# Patient Record
Sex: FEMALE | Employment: FULL TIME | URBAN - METROPOLITAN AREA
[De-identification: names, ages, dates, MRNs, and addresses within clinical notes are randomized per-mention and may not be internally consistent; named-entity substitution may affect disease eponyms.]

---

## 2017-02-02 ENCOUNTER — OFFICE VISIT (OUTPATIENT)
Dept: GYNECOLOGY | Age: 27
End: 2017-02-02

## 2017-02-02 VITALS
HEIGHT: 60 IN | DIASTOLIC BLOOD PRESSURE: 95 MMHG | WEIGHT: 118 LBS | BODY MASS INDEX: 23.16 KG/M2 | SYSTOLIC BLOOD PRESSURE: 138 MMHG | HEART RATE: 91 BPM

## 2017-02-02 DIAGNOSIS — D06.9 CIN III (CERVICAL INTRAEPITHELIAL NEOPLASIA III): Primary | ICD-10-CM

## 2017-02-02 NOTE — PROGRESS NOTES
[address] [place] [address] Mathias Moritz [address], [address] [facility]  [phone]  MD Sheree Webb MD Robbi Rodriguez, NP    Office Note  Patient ID:  Name:  Martina Lozano  MRN:  [id number]  :   y.o. Date:  2017      HISTORY OF PRESENT ILLNESS:  Martina Son is a 32 y.o.  premenopausal female who is being seen for severe cervical dysplasia. She is referred by Dr. Stanford Franco in [facility]. A recent pap smear demonstrated ASCUS, can't exclude a high-grade lesion. She was also noted to be high-risk HPV positive. She underwent subsequent colposcopy with biopsy. This revealed IRIS 2 on biopsy, but an ECC demonstrated IRIS 3. I have been asked to see her in consultation for further evaluation and management. She has never had an abnormal pap smear in the past.  She is extremely anxious about the diagnosis. ROS:   and GI review: Negative  Cardiopulmonary review:Negative   Musculoskeletal:  Negative    A comprehensive review of systems was negative except for that written in the History of Present Illness. , 10 point ROS    OB/GYN ROS:    There is no history of significant gyn problems or procedures. Patient denies any abnormal bleeding or vaginal discharge. Problem List:  Patient Active Problem List    Diagnosis Date Noted    IRIS III (cervical intraepithelial neoplasia III) 2017     PMH:  Past Medical History   Diagnosis Date    IRIS III (cervical intraepithelial neoplasia grade III) with severe dysplasia 2017     cervical biopsy      PSH:  History reviewed. No pertinent past surgical history.    Social History:  Social History   Substance Use Topics    Smoking status: Former Smoker    Smokeless tobacco: Not on file    Alcohol use Yes      Comment: 1-2 week      Family History:  Family History   Problem Relation Age of Onset    Cancer Maternal Grandmother       Medications: (reviewed)  No current outpatient prescriptions on file. No current facility-administered medications for this visit. Allergies: (reviewed)  Allergies   Allergen Reactions    Pertussis Vaccine,Adsorbed Shortness of Breath          OBJECTIVE:    Physical Exam:  VITAL SIGNS: Vitals:    02/02/17 0912   BP: (!) 138/95   Pulse: 91   Weight: 118 lb (53.5 kg)   Height: 5' (1.524 m)     Body mass index is 23.05 kg/(m^2). GENERAL LACIE: Conversant, alert, oriented. No acute distress. HEENT: HEENT. No thyroid enlargement. No JVD. Neck: Supple without restrictions. RESPIRATORY: Clear to auscultation and percussion to the bases. No CVAT. CARDIOVASC: RRR without murmur/rub. GASTROINT: soft, non-tender, without masses or organomegaly   MUSCULOSKEL: no joint tenderness, deformity or swelling   EXTREMITIES: extremities normal, atraumatic, no cyanosis or edema   PELVIC: Vulva and vagina appear normal. Normal appearing cervix. No obvious lesions. Bimanual exam reveals normal uterus and adnexa. RECTAL: Deferred   FANTA SURVEY: No suspicious lymphadenopathy or edema noted. NEURO: Grossly intact. No acute deficit. IMPRESSION/PLAN:  Alex Juárez is a 32 y.o. female with a working diagnosis of IRIS 2-3. I reviewed with Alex Juárez her medical records, physical exam, and review of symptoms. I explained to her that she has a precancerous lesion, but not cancer. She seemed very scared today. I reassured her that this is very manageable and it should not effect fertility. I have recommended a conization of the cervix. We should be able to tailor a very narrow cone to remove the involved area, thus not having to remove significant bulk of the cervix. She was counseled on the risks, benefits, indications, and alternatives of the procedure. Her questions were answered and she wishes to proceed.       Signed By: Jame Mosquera MD     2/2/2017/9:12 AM

## 2017-02-02 NOTE — LETTER
2017 10:18 AM 
 
Patient:  Wiley Merino YOB: 1990 Date of Visit: 2017 Dear Tressa Hutchison MD 
[address] [facility] [address] [place] [address] VIA Facsimile: [phone] 
 : Thank you for referring Ms. Wiley Merino to me for evaluation/treatment. Below are the relevant portions of my assessment and plan of care. Pt referred by Dr. Usman Nguyen for IRIS 2/3 on biopsies done 17. [address], [address] 
[phone] MD Heidi Burt MD Vira Radon, ARMINDA Office Note Patient ID: 
Name:  Wiley Merino MRN:  [id number] :  1990/26 y.o. Date:  2017 HISTORY OF PRESENT ILLNESS: 
Wiley Merino is a 32 y.o.  premenopausal female who is being seen for severe cervical dysplasia. She is referred by Dr. Tressa Hutchison in [facility]. A recent pap smear demonstrated ASCUS, can't exclude a high-grade lesion. She was also noted to be high-risk HPV positive. She underwent subsequent colposcopy with biopsy. This revealed IRIS 2 on biopsy, but an ECC demonstrated IRIS 3. I have been asked to see her in consultation for further evaluation and management. She has never had an abnormal pap smear in the past.  She is extremely anxious about the diagnosis. ROS: 
 and GI review: Negative Cardiopulmonary review:Negative Musculoskeletal:  Negative A comprehensive review of systems was negative except for that written in the History of Present Illness. , 10 point ROS 
 
OB/GYN ROS: 
 There is no history of significant gyn problems or procedures. Patient denies any abnormal bleeding or vaginal discharge. Problem List: 
Patient Active Problem List  
 Diagnosis Date Noted  IRIS III (cervical intraepithelial neoplasia III) 2017 PMH: 
Past Medical History Diagnosis Date  IRIS III (cervical intraepithelial neoplasia grade III) with severe dysplasia 01/16/2017  
  cervical biopsy PSH: 
History reviewed. No pertinent past surgical history. Social History: 
Social History Substance Use Topics  Smoking status: Former Smoker  Smokeless tobacco: Not on file  Alcohol use Yes Comment: 1-2 week Family History: 
Family History Problem Relation Age of Onset  Cancer Maternal Grandmother Medications: (reviewed) No current outpatient prescriptions on file. No current facility-administered medications for this visit. Allergies: (reviewed) Allergies Allergen Reactions  Pertussis Vaccine,Adsorbed Shortness of Breath OBJECTIVE: 
 
Physical Exam: VITAL SIGNS: Vitals:  
 02/02/17 0912 BP: (!) 138/95 Pulse: 91  
Weight: 118 lb (53.5 kg) Height: 5' (1.524 m) Body mass index is 23.05 kg/(m^2). GENERAL LACIE: Conversant, alert, oriented. No acute distress. HEENT: HEENT. No thyroid enlargement. No JVD. Neck: Supple without restrictions. RESPIRATORY: Clear to auscultation and percussion to the bases. No CVAT. CARDIOVASC: RRR without murmur/rub. GASTROINT: soft, non-tender, without masses or organomegaly MUSCULOSKEL: no joint tenderness, deformity or swelling EXTREMITIES: extremities normal, atraumatic, no cyanosis or edema PELVIC: Vulva and vagina appear normal. Normal appearing cervix. No obvious lesions. Bimanual exam reveals normal uterus and adnexa. RECTAL: Deferred FANTA SURVEY: No suspicious lymphadenopathy or edema noted. NEURO: Grossly intact. No acute deficit. IMPRESSION/PLAN: 
Uriel Bryant is a 32 y.o. female with a working diagnosis of IRIS 2-3. I reviewed with Uriel Bryant her medical records, physical exam, and review of symptoms. I explained to her that she has a precancerous lesion, but not cancer. She seemed very scared today. I reassured her that this is very manageable and it should not effect fertility.   I have recommended a conization of the cervix. We should be able to tailor a very narrow cone to remove the involved area, thus not having to remove significant bulk of the cervix. She was counseled on the risks, benefits, indications, and alternatives of the procedure. Her questions were answered and she wishes to proceed. Signed By: Macey Bryant MD   
 2/2/2017/9:12 AM  
 
 
 
If you have questions, please do not hesitate to call me. I look forward to following Ms. Shadi Kellogg along with you.  
 
 
 
Sincerely, 
 
 
Macey Bryant MD

## 2017-02-02 NOTE — PATIENT INSTRUCTIONS
[employer] Activation    Thank you for requesting access to [employer]. Please follow the instructions below to securely access and download your online medical record. [employer] allows you to send messages to your doctor, view your test results, renew your prescriptions, schedule appointments, and more. How Do I Sign Up? 1. In your internet browser, go to [email or web address]. 2. Click on the First Time User? Click Here link in the Sign In box. You will see the New Member Sign Up page. 3. Enter your [employer] Access Code exactly as it appears below. You will not need to use this code after youve completed the sign-up process. If you do not sign up before the expiration date, you must request a new code. [employer] Access Code: [email or web address]  Expires: 5/3/2017  9:07 AM (This is the date your [employer] access code will )    4. Enter the last four digits of your Social Security Number (xxxx) and Date of Birth (mm/dd/yyyy) as indicated and click Submit. You will be taken to the next sign-up page. 5. Create a [employer] ID. This will be your [employer] login ID and cannot be changed, so think of one that is secure and easy to remember. 6. Create a [employer] password. You can change your password at any time. 7. Enter your Password Reset Question and Answer. This can be used at a later time if you forget your password. 8. Enter your e-mail address. You will receive e-mail notification when new information is available in [address] 9. Click Sign Up. You can now view and download portions of your medical record. 10. Click the Download Summary menu link to download a portable copy of your medical information. Additional Information    If you have questions, please visit the Frequently Asked Questions section of the [employer] website at [email or web address] Remember, [employer] is NOT to be used for urgent needs. For medical emergencies, dial 911.

## 2017-02-13 ENCOUNTER — ANESTHESIA EVENT (OUTPATIENT)
Dept: SURGERY | Age: 27
End: 2017-02-13
Payer: COMMERCIAL

## 2017-02-17 ENCOUNTER — SURGERY (OUTPATIENT)
Age: 27
End: 2017-02-17

## 2017-02-17 ENCOUNTER — ANESTHESIA (OUTPATIENT)
Dept: SURGERY | Age: 27
End: 2017-02-17
Payer: COMMERCIAL

## 2017-02-17 ENCOUNTER — HOSPITAL ENCOUNTER (OUTPATIENT)
Age: 27
Setting detail: OUTPATIENT SURGERY
Discharge: HOME | End: 2017-02-17
Attending: OBSTETRICS & GYNECOLOGY | Admitting: OBSTETRICS & GYNECOLOGY
Payer: COMMERCIAL

## 2017-02-17 VITALS
BODY MASS INDEX: 23.56 KG/M2 | OXYGEN SATURATION: 100 % | DIASTOLIC BLOOD PRESSURE: 85 MMHG | HEIGHT: 60 IN | HEART RATE: 60 BPM | SYSTOLIC BLOOD PRESSURE: 129 MMHG | TEMPERATURE: 98 F | RESPIRATION RATE: 12 BRPM | WEIGHT: 120 LBS

## 2017-02-17 LAB
DAILY QC (YES/NO)?: YES
HCG UR QL: NEGATIVE
HGB BLD-MCNC: 13.8 G/DL (ref 11.5–16)

## 2017-02-17 PROCEDURE — 74011250636 HC RX REV CODE- 250/636

## 2017-02-17 PROCEDURE — 76060000032 HC ANESTHESIA 0.5 TO 1 HR: Performed by: OBSTETRICS & GYNECOLOGY

## 2017-02-17 PROCEDURE — 77030010509 HC AIRWY LMA MSK TELE -A: Performed by: ANESTHESIOLOGY

## 2017-02-17 PROCEDURE — 77030011640 HC PAD GRND REM COVD -A: Performed by: OBSTETRICS & GYNECOLOGY

## 2017-02-17 PROCEDURE — 74011000258 HC RX REV CODE- 258: Performed by: OBSTETRICS & GYNECOLOGY

## 2017-02-17 PROCEDURE — 76210000006 HC OR PH I REC 0.5 TO 1 HR: Performed by: OBSTETRICS & GYNECOLOGY

## 2017-02-17 PROCEDURE — 85018 HEMOGLOBIN: CPT | Performed by: OBSTETRICS & GYNECOLOGY

## 2017-02-17 PROCEDURE — 88307 TISSUE EXAM BY PATHOLOGIST: CPT | Performed by: OBSTETRICS & GYNECOLOGY

## 2017-02-17 PROCEDURE — 74011000250 HC RX REV CODE- 250: Performed by: OBSTETRICS & GYNECOLOGY

## 2017-02-17 PROCEDURE — 74011250636 HC RX REV CODE- 250/636: Performed by: ANESTHESIOLOGY

## 2017-02-17 PROCEDURE — 74011000250 HC RX REV CODE- 250

## 2017-02-17 PROCEDURE — 74011250637 HC RX REV CODE- 250/637: Performed by: ANESTHESIOLOGY

## 2017-02-17 PROCEDURE — 77030005537 HC CATH URETH BARD -A: Performed by: OBSTETRICS & GYNECOLOGY

## 2017-02-17 PROCEDURE — 77030014007 HC SPNG HEMSTAT J&J -B: Performed by: OBSTETRICS & GYNECOLOGY

## 2017-02-17 PROCEDURE — 76210000021 HC REC RM PH II 0.5 TO 1 HR: Performed by: OBSTETRICS & GYNECOLOGY

## 2017-02-17 PROCEDURE — 88305 TISSUE EXAM BY PATHOLOGIST: CPT | Performed by: OBSTETRICS & GYNECOLOGY

## 2017-02-17 PROCEDURE — 77030031139 HC SUT VCRL2 J&J -A: Performed by: OBSTETRICS & GYNECOLOGY

## 2017-02-17 PROCEDURE — 76010000138 HC OR TIME 0.5 TO 1 HR: Performed by: OBSTETRICS & GYNECOLOGY

## 2017-02-17 PROCEDURE — 81025 URINE PREGNANCY TEST: CPT

## 2017-02-17 PROCEDURE — 74011250637 HC RX REV CODE- 250/637: Performed by: OBSTETRICS & GYNECOLOGY

## 2017-02-17 PROCEDURE — 77030018836 HC SOL IRR NACL ICUM -A: Performed by: OBSTETRICS & GYNECOLOGY

## 2017-02-17 RX ORDER — OXYCODONE AND ACETAMINOPHEN 5; 325 MG/1; MG/1
1 TABLET ORAL
Qty: 20 TAB | Refills: 0 | Status: SHIPPED | OUTPATIENT
Start: 2017-02-17

## 2017-02-17 RX ORDER — FENTANYL CITRATE 50 UG/ML
25 INJECTION, SOLUTION INTRAMUSCULAR; INTRAVENOUS
Status: DISCONTINUED | OUTPATIENT
Start: 2017-02-17 | End: 2017-02-17 | Stop reason: HOSPADM

## 2017-02-17 RX ORDER — SODIUM CHLORIDE 9 MG/ML
50 INJECTION, SOLUTION INTRAVENOUS CONTINUOUS
Status: DISCONTINUED | OUTPATIENT
Start: 2017-02-17 | End: 2017-02-17 | Stop reason: HOSPADM

## 2017-02-17 RX ORDER — DEXAMETHASONE SODIUM PHOSPHATE 4 MG/ML
INJECTION, SOLUTION INTRA-ARTICULAR; INTRALESIONAL; INTRAMUSCULAR; INTRAVENOUS; SOFT TISSUE AS NEEDED
Status: DISCONTINUED | OUTPATIENT
Start: 2017-02-17 | End: 2017-02-17 | Stop reason: HOSPADM

## 2017-02-17 RX ORDER — SODIUM CHLORIDE 0.9 % (FLUSH) 0.9 %
5-10 SYRINGE (ML) INJECTION AS NEEDED
Status: DISCONTINUED | OUTPATIENT
Start: 2017-02-17 | End: 2017-02-17 | Stop reason: HOSPADM

## 2017-02-17 RX ORDER — SODIUM CHLORIDE, SODIUM LACTATE, POTASSIUM CHLORIDE, CALCIUM CHLORIDE 600; 310; 30; 20 MG/100ML; MG/100ML; MG/100ML; MG/100ML
125 INJECTION, SOLUTION INTRAVENOUS CONTINUOUS
Status: DISCONTINUED | OUTPATIENT
Start: 2017-02-17 | End: 2017-02-17 | Stop reason: HOSPADM

## 2017-02-17 RX ORDER — MIDAZOLAM HYDROCHLORIDE 1 MG/ML
INJECTION, SOLUTION INTRAMUSCULAR; INTRAVENOUS AS NEEDED
Status: DISCONTINUED | OUTPATIENT
Start: 2017-02-17 | End: 2017-02-17 | Stop reason: HOSPADM

## 2017-02-17 RX ORDER — HYDROCODONE BITARTRATE AND ACETAMINOPHEN 5; 325 MG/1; MG/1
1 TABLET ORAL AS NEEDED
Status: DISCONTINUED | OUTPATIENT
Start: 2017-02-17 | End: 2017-02-17 | Stop reason: HOSPADM

## 2017-02-17 RX ORDER — SODIUM CHLORIDE 0.9 % (FLUSH) 0.9 %
5-10 SYRINGE (ML) INJECTION EVERY 8 HOURS
Status: DISCONTINUED | OUTPATIENT
Start: 2017-02-17 | End: 2017-02-17 | Stop reason: HOSPADM

## 2017-02-17 RX ORDER — HYDROMORPHONE HYDROCHLORIDE 1 MG/ML
0.2 INJECTION, SOLUTION INTRAMUSCULAR; INTRAVENOUS; SUBCUTANEOUS
Status: DISCONTINUED | OUTPATIENT
Start: 2017-02-17 | End: 2017-02-17 | Stop reason: HOSPADM

## 2017-02-17 RX ORDER — ONDANSETRON 2 MG/ML
4 INJECTION INTRAMUSCULAR; INTRAVENOUS AS NEEDED
Status: DISCONTINUED | OUTPATIENT
Start: 2017-02-17 | End: 2017-02-17 | Stop reason: HOSPADM

## 2017-02-17 RX ORDER — FENTANYL CITRATE 50 UG/ML
INJECTION, SOLUTION INTRAMUSCULAR; INTRAVENOUS AS NEEDED
Status: DISCONTINUED | OUTPATIENT
Start: 2017-02-17 | End: 2017-02-17 | Stop reason: HOSPADM

## 2017-02-17 RX ORDER — SODIUM CHLORIDE, SODIUM LACTATE, POTASSIUM CHLORIDE, CALCIUM CHLORIDE 600; 310; 30; 20 MG/100ML; MG/100ML; MG/100ML; MG/100ML
25 INJECTION, SOLUTION INTRAVENOUS CONTINUOUS
Status: DISCONTINUED | OUTPATIENT
Start: 2017-02-17 | End: 2017-02-17 | Stop reason: HOSPADM

## 2017-02-17 RX ORDER — MORPHINE SULFATE 10 MG/ML
2 INJECTION, SOLUTION INTRAMUSCULAR; INTRAVENOUS
Status: DISCONTINUED | OUTPATIENT
Start: 2017-02-17 | End: 2017-02-17 | Stop reason: HOSPADM

## 2017-02-17 RX ORDER — ACETAMINOPHEN 325 MG/1
325 TABLET ORAL
COMMUNITY

## 2017-02-17 RX ORDER — ONDANSETRON 2 MG/ML
INJECTION INTRAMUSCULAR; INTRAVENOUS AS NEEDED
Status: DISCONTINUED | OUTPATIENT
Start: 2017-02-17 | End: 2017-02-17 | Stop reason: HOSPADM

## 2017-02-17 RX ORDER — LIDOCAINE HYDROCHLORIDE 10 MG/ML
0.1 INJECTION, SOLUTION EPIDURAL; INFILTRATION; INTRACAUDAL; PERINEURAL AS NEEDED
Status: DISCONTINUED | OUTPATIENT
Start: 2017-02-17 | End: 2017-02-17 | Stop reason: HOSPADM

## 2017-02-17 RX ORDER — KETOROLAC TROMETHAMINE 30 MG/ML
INJECTION, SOLUTION INTRAMUSCULAR; INTRAVENOUS AS NEEDED
Status: DISCONTINUED | OUTPATIENT
Start: 2017-02-17 | End: 2017-02-17 | Stop reason: HOSPADM

## 2017-02-17 RX ORDER — MIDAZOLAM HYDROCHLORIDE 1 MG/ML
1 INJECTION, SOLUTION INTRAMUSCULAR; INTRAVENOUS AS NEEDED
Status: DISCONTINUED | OUTPATIENT
Start: 2017-02-17 | End: 2017-02-17 | Stop reason: HOSPADM

## 2017-02-17 RX ORDER — MIDAZOLAM HYDROCHLORIDE 1 MG/ML
0.5 INJECTION, SOLUTION INTRAMUSCULAR; INTRAVENOUS
Status: DISCONTINUED | OUTPATIENT
Start: 2017-02-17 | End: 2017-02-17 | Stop reason: HOSPADM

## 2017-02-17 RX ORDER — FENTANYL CITRATE 50 UG/ML
50 INJECTION, SOLUTION INTRAMUSCULAR; INTRAVENOUS AS NEEDED
Status: DISCONTINUED | OUTPATIENT
Start: 2017-02-17 | End: 2017-02-17 | Stop reason: HOSPADM

## 2017-02-17 RX ORDER — LIDOCAINE HYDROCHLORIDE 20 MG/ML
INJECTION, SOLUTION EPIDURAL; INFILTRATION; INTRACAUDAL; PERINEURAL AS NEEDED
Status: DISCONTINUED | OUTPATIENT
Start: 2017-02-17 | End: 2017-02-17 | Stop reason: HOSPADM

## 2017-02-17 RX ORDER — PROPOFOL 10 MG/ML
INJECTION, EMULSION INTRAVENOUS AS NEEDED
Status: DISCONTINUED | OUTPATIENT
Start: 2017-02-17 | End: 2017-02-17 | Stop reason: HOSPADM

## 2017-02-17 RX ORDER — DIPHENHYDRAMINE HYDROCHLORIDE 50 MG/ML
12.5 INJECTION, SOLUTION INTRAMUSCULAR; INTRAVENOUS AS NEEDED
Status: DISCONTINUED | OUTPATIENT
Start: 2017-02-17 | End: 2017-02-17 | Stop reason: HOSPADM

## 2017-02-17 RX ORDER — SODIUM CHLORIDE, SODIUM LACTATE, POTASSIUM CHLORIDE, CALCIUM CHLORIDE 600; 310; 30; 20 MG/100ML; MG/100ML; MG/100ML; MG/100ML
75 INJECTION, SOLUTION INTRAVENOUS CONTINUOUS
Status: DISCONTINUED | OUTPATIENT
Start: 2017-02-17 | End: 2017-02-17 | Stop reason: HOSPADM

## 2017-02-17 RX ORDER — SODIUM CHLORIDE 9 MG/ML
25 INJECTION, SOLUTION INTRAVENOUS CONTINUOUS
Status: DISCONTINUED | OUTPATIENT
Start: 2017-02-17 | End: 2017-02-17 | Stop reason: HOSPADM

## 2017-02-17 RX ADMIN — HYDROCODONE BITARTRATE AND ACETAMINOPHEN 1 TABLET: 5; 325 TABLET ORAL at 09:15

## 2017-02-17 RX ADMIN — SODIUM CHLORIDE, POTASSIUM CHLORIDE, SODIUM LACTATE AND CALCIUM CHLORIDE: 600; 310; 30; 20 INJECTION, SOLUTION INTRAVENOUS at 07:28

## 2017-02-17 RX ADMIN — KETOROLAC TROMETHAMINE 30 MG: 30 INJECTION, SOLUTION INTRAMUSCULAR; INTRAVENOUS at 07:51

## 2017-02-17 RX ADMIN — FENTANYL CITRATE 50 MCG: 50 INJECTION, SOLUTION INTRAMUSCULAR; INTRAVENOUS at 07:52

## 2017-02-17 RX ADMIN — LUGOLS 8 ML: 0.4 LIQUID TOPICAL at 07:55

## 2017-02-17 RX ADMIN — DEXAMETHASONE SODIUM PHOSPHATE 8 MG: 4 INJECTION, SOLUTION INTRA-ARTICULAR; INTRALESIONAL; INTRAMUSCULAR; INTRAVENOUS; SOFT TISSUE at 07:38

## 2017-02-17 RX ADMIN — PROPOFOL 150 MG: 10 INJECTION, EMULSION INTRAVENOUS at 07:38

## 2017-02-17 RX ADMIN — LIDOCAINE HYDROCHLORIDE 40 MG: 20 INJECTION, SOLUTION EPIDURAL; INFILTRATION; INTRACAUDAL; PERINEURAL at 07:38

## 2017-02-17 RX ADMIN — MIDAZOLAM HYDROCHLORIDE 2 MG: 1 INJECTION, SOLUTION INTRAMUSCULAR; INTRAVENOUS at 07:31

## 2017-02-17 RX ADMIN — ONDANSETRON 4 MG: 2 INJECTION INTRAMUSCULAR; INTRAVENOUS at 07:38

## 2017-02-17 RX ADMIN — CEFOTETAN DISODIUM 2 G: 2 INJECTION, POWDER, FOR SOLUTION INTRAMUSCULAR; INTRAVENOUS at 07:44

## 2017-02-17 RX ADMIN — FENTANYL CITRATE 25 MCG: 50 INJECTION, SOLUTION INTRAMUSCULAR; INTRAVENOUS at 08:13

## 2017-02-17 NOTE — ROUTINE PROCESS
Patient: Vikas Carranza MRN: [id number]  SSN: [id number]   YOB: 1990  Age: 32 y.o. Sex: female     Patient is status post Procedure(s):  COLD KNIFE CONIZATION, ENDOCERVICAL CURETTAGE    Surgeon(s) and Role:     * Bonilla Jamil MD - Primary    Local/Dose/Irrigation:  LUGOLS SOLUTION                  Peripheral IV 02/17/17 Left Wrist (Active)   Site Assessment Clean, dry, & intact 2/17/2017  8:13 AM   Phlebitis Assessment 0 2/17/2017  8:13 AM   Infiltration Assessment 0 2/17/2017  8:13 AM   Dressing Status Occlusive 2/17/2017  8:13 AM   Dressing Type Transparent 2/17/2017  8:13 AM   Hub Color/Line Status Infusing 2/17/2017  8:13 AM   Alcohol Cap Used Yes 2/17/2017  8:13 AM                           Dressing/Packing:  [REMOVED] Wound Perineum Anterior-DRESSING TYPE: (P) Sandra-pad (02/17/17 0813)  Splint/Cast:  ]    Other:        CORRECT PROCEDURE:  COLD KNIFE  CONIZATION.

## 2017-02-17 NOTE — ANESTHESIA PREPROCEDURE EVALUATION
Anesthetic History   No history of anesthetic complications            Review of Systems / Medical History  Patient summary reviewed, nursing notes reviewed and pertinent labs reviewed    Pulmonary  Within defined limits                 Neuro/Psych   Within defined limits           Cardiovascular  Within defined limits                     GI/Hepatic/Renal  Within defined limits              Endo/Other  Within defined limits           Other Findings              Physical Exam    Airway  Mallampati: II  TM Distance: > 6 cm  Neck ROM: normal range of motion   Mouth opening: Normal     Cardiovascular  Regular rate and rhythm,  S1 and S2 normal,  no murmur, click, rub, or gallop             Dental  No notable dental hx       Pulmonary  Breath sounds clear to auscultation               Abdominal  GI exam deferred       Other Findings            Anesthetic Plan    ASA: 1  Anesthesia type: general          Induction: Intravenous  Anesthetic plan and risks discussed with: Patient

## 2017-02-17 NOTE — IP AVS SNAPSHOT
[address] 
[phone] Patient: Kelley Lozano MRN: [id number] DWR:[id number] You are allergic to the following Allergen Reactions Pertussis Vaccine,Adsorbed Shortness of Breath Recent Documentation Height Weight BMI OB Status Smoking Status 1.524 m 54.4 kg 23.44 kg/m2 Having regular periods Former Smoker Emergency Contacts Name Discharge Info Relation Home Work Mobile J Carlos Hoyt CAREGIVER [3] Mother [14] [phone] About your hospitalization You were admitted on:  February 17, 2017 You last received care in the:  [facility] PACU You were discharged on:  February 17, 2017 Unit phone number:  [phone] Why you were hospitalized Your primary diagnosis was:  Not on File Providers Seen During Your Hospitalizations Provider Role Specialty Primary office phone Carmella Bass MD Attending Provider Gynecologic Oncology [phone] Your Primary Care Physician (PCP) Primary Care Physician Office Phone Office Fax Brenda, [facility] [phone] Follow-up Information Follow up With Details Comments Contact Info Ofe Morrell MD   [place] [address] 
[phone] Carmella Bass MD Follow up in 1 month(s) call the office to schedule a follow up visit [address] [place] [address] 603 [facility] [address] [place] [address] 
[phone] Current Discharge Medication List  
  
START taking these medications Dose & Instructions Dispensing Information Comments Morning Noon Evening Bedtime  
 oxyCODONE-acetaminophen 5-325 mg per tablet Commonly known as:  PERCOCET Your next dose is: Today, Tomorrow Other:  _________ Dose:  1 Tab Take 1 Tab by mouth every four (4) hours as needed for Pain.  Max Daily Amount: 6 Tabs. Quantity:  20 Tab Refills:  0 CONTINUE these medications which have NOT CHANGED Dose & Instructions Dispensing Information Comments Morning Noon Evening Bedtime TYLENOL 325 mg tablet Generic drug:  acetaminophen Your next dose is: Today, Tomorrow Other:  _________ Dose:  325 mg Take 325 mg by mouth every four (4) hours as needed for Pain. Refills:  0 Where to Get Your Medications Information on where to get these meds will be given to you by the nurse or doctor. ! Ask your nurse or doctor about these medications  
  oxyCODONE-acetaminophen 5-325 mg per tablet Discharge Instructions Cone Biopsy: What to Expect at [facility] Your Recovery After your surgery, it is normal to feel tired for a couple days. You may have some pain or cramps in your lower belly for several days. Usually over-the-counter pain medicines, such as ibuprofen, are enough to help with the pain. After a cone biopsy, you will probably be able to go back to work or your normal routine in about 1 or 2 days. This care sheet gives you a general idea about how long it will take for you to recover. But each person recovers at a different pace. Follow the steps below to get better as quickly as possible. How can you care for yourself at home? Activity · Rest when you feel tired. Getting enough sleep will help you recover. · Try to walk each day. Walking boosts blood flow and helps prevent pneumonia and constipation. · You may shower 24 to 48 hours after surgery, if your doctor okays it. Do not take a bath for the first 2 weeks, or until your doctor tells you it is okay. · You will have some light vaginal bleeding or discharge. This usually lasts for up to a week or two after surgery. Wear sanitary pads if needed. Do not douche or use tampons. · You will probably need to take 1 or 2 days off work. It depends on the type of work you do and how you feel. · Do not have sex or place anything in your vagina for 4 to 6 weeks after surgery, or until your doctor tells you it is okay. Diet · You can eat your normal diet. If your stomach is upset, try bland, low-fat foods like plain rice, broiled chicken, toast, and yogurt. · Drink plenty of fluids (unless your doctor tells you not to). · You may notice that your bowel movements are not regular right after your surgery. This is common. Try to avoid constipation and straining with bowel movements. You may want to take a fiber supplement every day. If you have not had a bowel movement after a couple of days, ask your doctor about taking a mild laxative. Medicines · Your doctor will tell you if and when you can restart your medicines. He or she will also give you instructions about taking any new medicines. · If you take blood thinners, such as warfarin (Coumadin), clopidogrel (Plavix), or aspirin, be sure to talk to your doctor. He or she will tell you if and when to start taking those medicines again. Make sure that you understand exactly what your doctor wants you to do. · Take pain medicines exactly as directed. ¨ If the doctor gave you a prescription medicine for pain, take it as prescribed. ¨ If you are not taking a prescription pain medicine, ask your doctor if you can take an over-the-counter medicine. · If you think your pain medicine is making you sick to your stomach: 
¨ Take your medicine after meals (unless your doctor tells you not to). ¨ Ask your doctor for a different pain medicine. · If your doctor prescribed antibiotics, take them as directed. Do not stop taking them just because you feel better. You need to take the full course of antibiotics. Other instructions · If you have cramps after your surgery, try placing a hot water bottle or heating pad on your lower belly. Follow-up care is a key part of your treatment and safety. Be sure to make and go to all appointments, and call your doctor if you are having problems. It's also a good idea to know your test results and keep a list of the medicines you take. When should you call for help? Call 911 anytime you think you may need emergency care. For example, call if: 
· You passed out (lost consciousness). · You have sudden chest pain and shortness of breath, or you cough up blood. Call your doctor now or seek immediate medical care if: 
· You have severe belly pain. · You have bright red vaginal bleeding that soaks one or more pads in an hour, or you have large clots. · You have foul-smelling discharge from your vagina. · You are sick to your stomach and cannot keep fluids down. · You have pain that does not go away when you take your pain medicine. · You have a fever over 100°F. 
· You have signs of a blood clot, such as: 
¨ Pain in your calf, back of the knee, thigh, or groin. ¨ Redness and swelling in your leg or groin. · You have trouble passing urine or stool, especially if you have mild pain or swelling in your lower belly. · You have hot flashes, sweating, flushing, or a fast or pounding heartbeat. Watch closely for changes in your health, and be sure to contact your doctor if: 
· You do not have a bowel movement after taking a laxative. · You do not get better as expected. Where can you learn more? Go to http://helena-valeria.info/. Enter [phone] in the search box to learn more about \"Cone Biopsy: What to Expect at Home. \" Current as of: July 26, 2016 Content Version: 11.1 © 3035-6715 [employer]. Care instructions adapted under license by [employer] (which disclaims liability or warranty for this information).  If you have questions about a medical condition or this instruction, always ask your healthcare professional. [employer] disclaims any warranty or liability for your use of this information. ______________________________________________________________________ Anesthesia Discharge Instructions After general anesthesia or intervenous sedation, for 24 hours or while taking prescription Narcotics: · Limit your activities · Do not drive or operate hazardous machinery · If you have not urinated within 8 hours after discharge, please contact your surgeon on call. · Do not make important personal or business decisions · Do not drink alcoholic beverages Report the following to your surgeon: 
· Excessive pain, swelling, redness or odor of or around the surgical area · Temperature over 100.5 degrees · Nausea and vomiting lasting longer than 4 hours or if unable to take medication · Any signs of decreased circulation or nerve impairment to extremity:  Change in color, persistent numbness, tingling, coldness or increased pain. · Any questions Discharge Orders None Introducing [facility]! [facility] introduces [employer] patient portal. Now you can access parts of your medical record, email your doctor's office, and request medication refills online. 1. In your internet browser, go to [email or web address]/"[email or web address] 2. Click on the First Time User? Click Here link in the Sign In box. You will see the New Member Sign Up page. 3. Enter your [employer] Access Code exactly as it appears below. You will not need to use this code after youve completed the sign-up process. If you do not sign up before the expiration date, you must request a new code. · [employer] Access Code: [email or web address] Expires: 5/3/2017  9:07 AM 
 
4. Enter the last four digits of your Social Security Number (xxxx) and Date of Birth (mm/dd/yyyy) as indicated and click Submit. You will be taken to the next sign-up page. 5. Create a [employer] ID.  This will be your [employer] login ID and cannot be changed, so think of one that is secure and easy to remember. 6. Create a [employer] password. You can change your password at any time. 7. Enter your Password Reset Question and Answer. This can be used at a later time if you forget your password. 8. Enter your e-mail address. You will receive e-mail notification when new information is available in [address] 9. Click Sign Up. You can now view and download portions of your medical record. 10. Click the Download Summary menu link to download a portable copy of your medical information. If you have questions, please visit the Frequently Asked Questions section of the [employer] website. Remember, [employer] is NOT to be used for urgent needs. For medical emergencies, dial 911. Now available from your iPhone and Android! General Information Please provide this summary of care documentation to your next provider. Patient Signature:  ____________________________________________________________ Date:  ____________________________________________________________  
  
Chichi Dawson Provider Signature:  ____________________________________________________________ Date:  ____________________________________________________________

## 2017-02-17 NOTE — OP NOTES
Gynecologic Oncology Operative Report    Ebony Mc  2/17/2017    Pre-operative dx:  IRIS 2-3    Post-operative dx:  IRIS 2-3    Procedure: 1) Cold knife conization of cervix    2) Endocervical curettage    Surgeon:  Mary Skinner MD    Assistant:  n/a     Anesthesia:  GETA    EBL:  <79 cc    Complications:  None    Specimens:  cervical cone biopsy, endocervical curettage    Operative indications:  31 yo WF with IRIS 2 on cervical biopsy and IRIS 3 on ECC. She was referred to me by her ob-gyn for conization. Operative findings:  Normal appearing cervix. No obvious lesions. Procedure in detail:  After the risks, benefits, indications, and alternatives of the procedure were discussed with the patient and informed consent was obtained, the patient was taken to the operating room. She was positively identified, administered general anesthesia, and then placed in the dorsal lithotomy position in candy cane stirrups. An exam under anesthesia was performed. She was then prepped and draped in the usual fashion. The bladder was drained with a red rubber catheter. A weighted speculum was then placed in the posterior vagina and a right angle retractor was placed in the anterior vagina in order to visualize the cervix. The anterior lip of the cervix was then grasped with a single tooth tenaculum for manipulation. Lugol's solution was used to identify the margins of the transition zone. A figure-of-eight suture of 0 vicryl was then placed on each side of the cervix to control bleeding and to use for manipulation of the cervix. The single tooth tenaculum was then removed. A #11 blade scalpel was then used to incise the cervix circumferentially. This incision was carried deeper into the cervix with curved Sims scissors. Once an adequate specimen was achieved, the specimen was transected at the base with the scissors and the specimen was sent for pathology.   An endocervical curettage was then performed above the level of the conization. The bed of the conization was then made hemostatic with electrocautery. A piece of Surgicel was then placed in the cone bed. A circumferential 0 vicryl suture was then placed through and through the cervix and secured to control any potential bleeding, over the Surgicel. The sutures were then cut and the retractors were removed. The patient was taken out of stirrups, awakened from anesthesia, and taken to the recovery room in stable condition. All instrument, sponge, and needle counts were correct.         Bonilla Jamil MD  2/17/2017  8:07 AM

## 2017-02-17 NOTE — BRIEF OP NOTE
BRIEF OPERATIVE NOTE    Date of Procedure: 2/17/2017   Preoperative Diagnosis: CERVICAL DYSPLASIA  Postoperative Diagnosis: CERVICAL DYSPLASIA    Procedure(s):  COLD KNIFE CONIZATION  Surgeon(s) and Role:     * Aramis De La Cruz MD - Primary  Surgical Staff:  Circ-1: Harriet Guajardo RN  Circ-2: Esther Santa RN  Scrub Tech-1: Fab Wallace Staff: Angeli Tyler RN  Event Time In   Incision Start 7475   Incision Close      Anesthesia: General   Estimated Blood Loss: <25 cc  Specimens:   ID Type Source Tests Collected by Time Destination   1 : CERVICAL CONE BIOPSY Fresh Cervix  Aramis De La Cruz MD 2/17/2017 0800 Pathology   2 : ENDOCERVICAL CURETTAGE Fresh Cervix  Aramis De La Cruz MD 2/17/2017 2655 Pathology      Findings: Normal appearing cervix. No obvious lesions.    Complications: None    Aramis De La Cruz MD

## 2017-02-17 NOTE — ANESTHESIA POSTPROCEDURE EVALUATION
Post-Anesthesia Evaluation and Assessment    Patient: Amanda Murphy MRN: [id number]  SSN: [id number]    YOB: 1990  Age: 32 y.o. Sex: female       Cardiovascular Function/Vital Signs  Visit Vitals    /79    Pulse (!) 56    Temp 36.7 °C (98 °F)    Resp 12    Ht 5' (1.524 m)    Wt 54.4 kg (120 lb)    SpO2 97%    BMI 23.44 kg/m2       Patient is status post general anesthesia for Procedure(s):  COLD KNIFE CONIZATION. Nausea/Vomiting: None    Postoperative hydration reviewed and adequate. Pain:  Pain Scale 1: Numeric (0 - 10) (02/17/17 0850)  Pain Intensity 1: 3 (02/17/17 0850)   Managed    Neurological Status:   Neuro (WDL): Within Defined Limits (02/17/17 0850)  Neuro  Neurologic State: Sleeping (02/17/17 0850)  LUE Motor Response: Purposeful (02/17/17 0813)  LLE Motor Response: Purposeful (02/17/17 0813)  RUE Motor Response: Purposeful (02/17/17 0813)  RLE Motor Response: Purposeful (02/17/17 0813)   At baseline    Mental Status and Level of Consciousness: Arousable    Pulmonary Status:   O2 Device: Room air (02/17/17 0850)   Adequate oxygenation and airway patent    Complications related to anesthesia: None    Post-anesthesia assessment completed.  No concerns    Signed By: Daysi Atwood MD     February 17, 2017

## 2017-02-17 NOTE — DISCHARGE INSTRUCTIONS
Cone Biopsy: What to Expect at Home  Your Recovery  After your surgery, it is normal to feel tired for a couple days. You may have some pain or cramps in your lower belly for several days. Usually over-the-counter pain medicines, such as ibuprofen, are enough to help with the pain. After a cone biopsy, you will probably be able to go back to work or your normal routine in about 1 or 2 days. This care sheet gives you a general idea about how long it will take for you to recover. But each person recovers at a different pace. Follow the steps below to get better as quickly as possible. How can you care for yourself at home? Activity  · Rest when you feel tired. Getting enough sleep will help you recover. · Try to walk each day. Walking boosts blood flow and helps prevent pneumonia and constipation. · You may shower 24 to 48 hours after surgery, if your doctor okays it. Do not take a bath for the first 2 weeks, or until your doctor tells you it is okay. · You will have some light vaginal bleeding or discharge. This usually lasts for up to a week or two after surgery. Wear sanitary pads if needed. Do not douche or use tampons. · You will probably need to take 1 or 2 days off work. It depends on the type of work you do and how you feel. · Do not have sex or place anything in your vagina for 4 to 6 weeks after surgery, or until your doctor tells you it is okay. Diet  · You can eat your normal diet. If your stomach is upset, try bland, low-fat foods like plain rice, broiled chicken, toast, and yogurt. · Drink plenty of fluids (unless your doctor tells you not to). · You may notice that your bowel movements are not regular right after your surgery. This is common. Try to avoid constipation and straining with bowel movements. You may want to take a fiber supplement every day. If you have not had a bowel movement after a couple of days, ask your doctor about taking a mild laxative.   Medicines  · Your doctor will tell you if and when you can restart your medicines. He or she will also give you instructions about taking any new medicines. · If you take blood thinners, such as warfarin (Coumadin), clopidogrel (Plavix), or aspirin, be sure to talk to your doctor. He or she will tell you if and when to start taking those medicines again. Make sure that you understand exactly what your doctor wants you to do. · Take pain medicines exactly as directed. ¨ If the doctor gave you a prescription medicine for pain, take it as prescribed. ¨ If you are not taking a prescription pain medicine, ask your doctor if you can take an over-the-counter medicine. · If you think your pain medicine is making you sick to your stomach:  ¨ Take your medicine after meals (unless your doctor tells you not to). ¨ Ask your doctor for a different pain medicine. · If your doctor prescribed antibiotics, take them as directed. Do not stop taking them just because you feel better. You need to take the full course of antibiotics. Other instructions  · If you have cramps after your surgery, try placing a hot water bottle or heating pad on your lower belly. Follow-up care is a key part of your treatment and safety. Be sure to make and go to all appointments, and call your doctor if you are having problems. It's also a good idea to know your test results and keep a list of the medicines you take. When should you call for help? Call 911 anytime you think you may need emergency care. For example, call if:  · You passed out (lost consciousness). · You have sudden chest pain and shortness of breath, or you cough up blood. Call your doctor now or seek immediate medical care if:  · You have severe belly pain. · You have bright red vaginal bleeding that soaks one or more pads in an hour, or you have large clots. · You have foul-smelling discharge from your vagina. · You are sick to your stomach and cannot keep fluids down.   · You have pain that does not go away when you take your pain medicine. · You have a fever over 100°F.  · You have signs of a blood clot, such as:  ¨ Pain in your calf, back of the knee, thigh, or groin. ¨ Redness and swelling in your leg or groin. · You have trouble passing urine or stool, especially if you have mild pain or swelling in your lower belly. · You have hot flashes, sweating, flushing, or a fast or pounding heartbeat. Watch closely for changes in your health, and be sure to contact your doctor if:  · You do not have a bowel movement after taking a laxative. · You do not get better as expected. Where can you learn more? Go to http://helena-valeria.info/. Enter [phone] in the search box to learn more about \"Cone Biopsy: What to Expect at Home. \"  Current as of: July 26, 2016  Content Version: 11.1  © 5381-5610 [employer]. Care instructions adapted under license by [employer] (which disclaims liability or warranty for this information). If you have questions about a medical condition or this instruction, always ask your healthcare professional. [address] any warranty or liability for your use of this information. ______________________________________________________________________    Anesthesia Discharge Instructions    After general anesthesia or intervenous sedation, for 24 hours or while taking prescription Narcotics:  · Limit your activities  · Do not drive or operate hazardous machinery  · If you have not urinated within 8 hours after discharge, please contact your surgeon on call.   · Do not make important personal or business decisions  · Do not drink alcoholic beverages    Report the following to your surgeon:  · Excessive pain, swelling, redness or odor of or around the surgical area  · Temperature over 100.5 degrees  · Nausea and vomiting lasting longer than 4 hours or if unable to take medication  · Any signs of decreased circulation or nerve impairment to extremity:  Change in color, persistent numbness, tingling, coldness or increased pain.   · Any questions

## 2017-03-23 ENCOUNTER — OFFICE VISIT (OUTPATIENT)
Dept: GYNECOLOGY | Age: 27
End: 2017-03-23

## 2017-03-23 VITALS
SYSTOLIC BLOOD PRESSURE: 117 MMHG | WEIGHT: 119.6 LBS | HEIGHT: 60 IN | BODY MASS INDEX: 23.48 KG/M2 | DIASTOLIC BLOOD PRESSURE: 74 MMHG | HEART RATE: 58 BPM

## 2017-03-23 DIAGNOSIS — D06.9 CIN III (CERVICAL INTRAEPITHELIAL NEOPLASIA III): Primary | ICD-10-CM

## 2017-03-23 NOTE — PROGRESS NOTES
Post operative follow up, [facility] 2/2/17, Patient states no abnormal spotting or bleeding. The pt is not taking the Oxycodone.

## 2017-03-23 NOTE — PROGRESS NOTES
[facility] [address], [address] [id number] [phone]  Dr. Jared Contreras, III    Dr. Florina Bach, [facility], NP    Postoperative Office Note  Patient ID:  Name: Vic Garcia  MRM: [id number]  :  y.o. Date: 3/23/2017    Diagnosis:     ICD-10-CM ICD-9-CM    1. IRIS III (cervical intraepithelial neoplasia III) D06.9 233.1        Problem List:   Patient Active Problem List   Diagnosis Code    IRIS III (cervical intraepithelial neoplasia III) D06.9       SUBJECTIVE:    Vic Garcia is a 32 y.o. female who presents for followup postoperative care following CKC. The patient's pathology revealed: FINAL PATHOLOGIC DIAGNOSIS  1. 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 Endocervix, curettings:   Fragments of benign endocervical glands and stroma, no evidence of dysplasia       Currently she has no problems with eating, bowel movements, or voiding. Appetite is good. Eating a regular diet without difficulty. Bowel movements are regular. The patient is not having any pain. Medications:     Current Outpatient Prescriptions on File Prior to Visit   Medication Sig Dispense Refill    acetaminophen (TYLENOL) 325 mg tablet Take 325 mg by mouth every four (4) hours as needed for Pain.  oxyCODONE-acetaminophen (PERCOCET) 5-325 mg per tablet Take 1 Tab by mouth every four (4) hours as needed for Pain. Max Daily Amount: 6 Tabs. 20 Tab 0     No current facility-administered medications on file prior to visit. Allergies:      Allergies   Allergen Reactions    Pertussis Vaccine,Adsorbed Shortness of Breath     Past Medical History:   Diagnosis Date    Anxiety     IRIS III (cervical intraepithelial neoplasia grade III) with severe dysplasia 2017 cervical biopsy     Past Surgical History:   Procedure Laterality Date    CKC, AKA COLD KNIFE CONE  02/02/2017    HX OTHER SURGICAL      WISDOM TEETH EXTRACTION     Social History     Social History    Marital status: SINGLE     Spouse name: N/A    Number of children: N/A    Years of education: N/A     Occupational History    Not on file. Social History Main Topics    Smoking status: Former Smoker     Quit date: 9/13/2016    Smokeless tobacco: Never Used      Comment: WAS ONLY A SOCIAL SMOKER    Alcohol use 2.4 oz/week     4 Glasses of wine per week      Comment: 1-2 week    Drug use: No    Sexual activity: Not Currently     Partners: Male     Birth control/ protection: None     Other Topics Concern    Not on file     Social History Narrative       OBJECTIVE:    Vitals:   Vitals:    03/23/17 0951   BP: 117/74   Pulse: (!) 58   Weight: 119 lb 9.6 oz (54.3 kg)   Height: 5' (1.524 m)     Physical Examination:     General:  alert, cooperative, no distress   Lungs: lungs clear to auscultation   Cardiac: Regular rate and rhythm   Abdomen: soft, bowel sounds active, non-tender  No CVA tenderness   Incision: healing well   Pelvic: Vulva and vagina appear normal. Cervix healing well s/p CK. Bimanual exam reveals normal uterus and adnexa. Rectal: not done   Extremity:   extremities normal, atraumatic, no cyanosis or edema     IMPRESSION:    Caroline Aguilar is doing well postoperatively. She has no apparent complications from surgery. Medical problems:     Patient Active Problem List   Diagnosis Code    IRIS III (cervical intraepithelial neoplasia III) D06.9       PLAN:    The operative procedures and clinical results have been reviewed with the patient. Implications of diagnosis discussed at length. All questions answered. I recommend a repeat pap smear in 6 months with Dr. Rebecca Alvares. I will see the patient back prn. The patient is advised to call our office with any problems or concerns.     Ana Maria Moreau Cole Braun MD  3/23/2017/9:48 AM

## 2018-02-27 ENCOUNTER — APPOINTMENT (OUTPATIENT)
Age: 28
Setting detail: DERMATOLOGY
End: 2018-03-01

## 2018-02-27 DIAGNOSIS — D485 NEOPLASM OF UNCERTAIN BEHAVIOR OF SKIN: ICD-10-CM

## 2018-02-27 DIAGNOSIS — L81.4 OTHER MELANIN HYPERPIGMENTATION: ICD-10-CM

## 2018-02-27 DIAGNOSIS — D22 MELANOCYTIC NEVI: ICD-10-CM

## 2018-02-27 DIAGNOSIS — Z71.89 OTHER SPECIFIED COUNSELING: ICD-10-CM

## 2018-02-27 PROBLEM — D22.5 MELANOCYTIC NEVI OF TRUNK: Status: ACTIVE | Noted: 2018-02-27

## 2018-02-27 PROBLEM — D48.5 NEOPLASM OF UNCERTAIN BEHAVIOR OF SKIN: Status: ACTIVE | Noted: 2018-02-27

## 2018-02-27 PROCEDURE — OTHER BIOPSY BY SHAVE METHOD: OTHER

## 2018-02-27 PROCEDURE — OTHER COUNSELING: OTHER

## 2018-02-27 PROCEDURE — 11101: CPT

## 2018-02-27 PROCEDURE — OTHER REASSURANCE: OTHER

## 2018-02-27 PROCEDURE — 11100: CPT

## 2018-02-27 PROCEDURE — OTHER MIPS QUALITY: OTHER

## 2018-02-27 PROCEDURE — 99203 OFFICE O/P NEW LOW 30 MIN: CPT | Mod: 25

## 2018-02-27 ASSESSMENT — LOCATION DETAILED DESCRIPTION DERM
LOCATION DETAILED: RIGHT LATERAL DORSAL WRIST
LOCATION DETAILED: LEFT SUPERIOR LATERAL UPPER BACK
LOCATION DETAILED: LEFT MEDIAL BREAST 10-11:00 REGION
LOCATION DETAILED: RIGHT MEDIAL UPPER BACK
LOCATION DETAILED: RIGHT INFERIOR LATERAL NECK
LOCATION DETAILED: LEFT DISTAL DORSAL FOREARM
LOCATION DETAILED: LEFT MID-UPPER BACK

## 2018-02-27 ASSESSMENT — LOCATION SIMPLE DESCRIPTION DERM
LOCATION SIMPLE: LEFT UPPER BACK
LOCATION SIMPLE: LEFT BREAST
LOCATION SIMPLE: RIGHT WRIST
LOCATION SIMPLE: RIGHT ANTERIOR NECK
LOCATION SIMPLE: RIGHT UPPER BACK
LOCATION SIMPLE: LEFT FOREARM

## 2018-02-27 ASSESSMENT — LOCATION ZONE DERM
LOCATION ZONE: NECK
LOCATION ZONE: TRUNK
LOCATION ZONE: ARM

## 2018-02-27 NOTE — PROCEDURE: BIOPSY BY SHAVE METHOD
Type Of Destruction Used: Curettage
Dressing: bandage
Curettage Text: The wound bed was treated with curettage after the biopsy was performed.
Silver Nitrate Text: The wound bed was treated with silver nitrate after the biopsy was performed.
Electrodesiccation And Curettage Text: The wound bed was treated with electrodesiccation and curettage after the biopsy was performed.
Bill 07340 For Specimen Handling/Conveyance To Laboratory?: no
Additional Anesthesia Volume In Cc (Will Not Render If 0): 0
Post-Care Instructions: I reviewed with the patient in detail post-care instructions. Patient is to keep the biopsy site dry overnight, and then apply bacitracin twice daily until healed. Patient may apply hydrogen peroxide soaks to remove any crusting.
Consent: Written consent was obtained and risks were reviewed including but not limited to scarring, infection, bleeding, scabbing, incomplete removal, nerve damage and allergy to anesthesia.
Biopsy Type: H and E
Wound Care: Vaseline
Biopsy Method: Dermablade
Billing Type: Third-Party Bill
Notification Instructions: Patient will be notified of biopsy results. However, patient instructed to call the office if not contacted within 2 weeks.
Detail Level: Simple
Anesthesia Type: 1% lidocaine with 1:100,000 epinephrine and a 1:12 solution of 8.4% sodium bicarbonate
Hemostasis: Aluminum Chloride
Cryotherapy Text: The wound bed was treated with cryotherapy after the biopsy was performed.
Anesthesia Volume In Cc (Will Not Render If 0): 1
Electrodesiccation Text: The wound bed was treated with electrodesiccation after the biopsy was performed.

## 2022-12-17 NOTE — H&P
[address] [place] [address] Mathias Mor[address], [address] [facility]  [phone]  Jodeane Slack, MD Arvilla Barnacle, MD [place], NP        Patient ID:  Name:  Mary Hsu  MRN:  [id number]  :   y.o. Date:  2017      HISTORY OF PRESENT ILLNESS:  Mary Hsu is a 32 y.o.  premenopausal female who is being seen for severe cervical dysplasia. [place] [facility] [place] A [place] smear demonstrated ASCUS, can't exclude a high-grade lesion. She was also noted to be high-risk HPV positive. She underwent subsequent colposcopy with biopsy. This revealed IRIS 2 on biopsy, but an ECC demonstrated IRIS 3. I have been asked to see her in consultation for further evaluation and management. She has never had an abnormal pap smear in the past. She is extremely anxious about the diagnosis.       ROS:   and GI review: Negative  Cardiopulmonary review:Negative   Musculoskeletal:  Negative     A comprehensive review of systems was negative except for that written in the History of Present Illness. , 10 point ROS     OB/GYN ROS:    There is no history of significant gyn problems or procedures.   Patient denies any abnormal bleeding or vaginal discharge.        Problem List:  Patient Active Problem List    Diagnosis Date Noted    IRIS III (cervical intraepithelial neoplasia III) 2017     PMH:  Past Medical History   Diagnosis Date    Anxiety     IRIS III (cervical intraepithelial neoplasia grade III) with severe dysplasia 2017     cervical biopsy      PSH:  Past Surgical History   Procedure Laterality Date    Hx other surgical       WISDOM TEETH EXTRACTION      Social History:  Social History   Substance Use Topics    Smoking status: Former Smoker     Quit date: 2016    Smokeless tobacco: Never Used      Comment: WAS ONLY A SOCIAL SMOKER    Alcohol use 2.4 oz/week     4 Glasses of wine per week      Comment: This note was copied from a baby's chart. Mom states feed have been going well - she is offering the breast on demand to early cues. Baby has plenty of wet and dirty diapers, reviewed adequate output. Weight loss WNL. Reviewed paced bottle feeding by request as well as engorgement precautions. Chart shows numerous feedings, void, stool WNL. Discussed importance of monitoring outputs and feedings on first week of life. Discussed ways to tell if baby is  getting enough breast milk, ie  voids and stools, change in color of stool, and return to birth wt within 2 weeks. Follow up with pediatrician visit for weight check in 1-2 days (per AAP guidelines.)  Encouraged to call Warm Line  [phone]  for any questions/problems that arise. Mother also given breastfeeding support group dates and times for any future needs . Pt taught that Paced Bottle Feeding is a method of bottle feeding that allows the infant to be more in control of the feeding pace. This method slows the flow of milk into the nipple and mouth, allowing the baby to eat more slowly, and take breaks. Paced feeding reduces the risk of overfeeding that may result in discomfort to the baby. This feeding method is recommended for any baby that receives bottles, whether fully bottle fed, or fed from the breast and a bottle. Pt taught to use slow flow nipple, hold baby in a semi-upright position, supporting the head and neck. When baby shows hunger cues, tickle babys lip so he opens his mouth wide. Insert nipple into babys mouth, making sure the baby has a deep latch. Hold the bottle flat, (horizontal to the floor). Let the baby begin sucking on the nipple without milk, then tip the bottle just enough to fill the nipple about [facility] with milk. Let baby suck for a few continuous swallows--20-30 seconds. After a few swallows, tip the bottle back down, giving baby a little break.  After a few seconds, when the baby begins to suck again, tip 1-2 week      Family History:  Family History   Problem Relation Age of Onset    Thyroid Disease Mother     Thyroid Disease Father     Cancer Maternal Grandmother      BREAST    No Known Problems Sister     No Known Problems Brother     No Known Problems Brother     No Known Problems Brother     Anesth Problems Neg Hx       Medications: (reviewed)  Current Facility-Administered Medications   Medication Dose Route Frequency    cefoTEtan (CEFOTAN) 2 g in 0.9% sodium chloride (MBP/ADV) 50 mL  2 g IntraVENous ON CALL TO OR    lactated ringers infusion  25 mL/hr IntraVENous CONTINUOUS     Allergies: (reviewed)  Allergies   Allergen Reactions    Pertussis Vaccine,Adsorbed Shortness of Breath          OBJECTIVE:    Physical Exam:  VITAL SIGNS: Vitals:    02/13/17 1241 02/17/17 0645   BP:  (!) 125/2   Pulse:  78   Resp:  17   Temp:  98.5 °F (36.9 °C)   SpO2:  100%   Weight: 120 lb (54.4 kg) 120 lb (54.4 kg)   Height: 5' (1.524 m) 5' (1.524 m)     Body mass index is 23.44 kg/(m^2). GENERAL LACIE: Conversant, alert, oriented. No acute distress. HEENT: HEENT. No thyroid enlargement. No JVD. Neck: Supple without restrictions. RESPIRATORY: Clear to auscultation and percussion to the bases. No CVAT. CARDIOVASC: RRR without murmur/rub. GASTROINT: soft, non-tender, without masses or organomegaly   MUSCULOSKEL: no joint tenderness, deformity or swelling   EXTREMITIES: extremities normal, atraumatic, no cyanosis or edema   PELVIC: Vulva and vagina appear normal. Normal appearing cervix. No obvious lesions. Bimanual exam reveals normal uterus and adnexa. RECTAL: Deferred   FANTA SURVEY: No suspicious lymphadenopathy or edema noted. NEURO: Grossly intact. No acute deficit. IMPRESSION/PLAN:  Kelley Lozano is a 32 y.o. female with a working diagnosis of IRIS 2-3. I reviewed with Kelley Lozano her medical records, physical exam, and review of symptoms.  I explained to her that she has a precancerous lesion, but bottle up to allow milk to flow into the nipple. Continue this Paced Feeding until baby shows fullness signs - no longer sucking after the break, turning away or pushing away from the nipple. This method can help to facilitate between breast and bottle. Pt will successfully establish breastfeeding by feeding in response to early feeding cues   or wake every 3h, will obtain deep latch, and will keep log of feedings/output. Taught to BF at hunger cues and or q 2-3 hrs and to offer 10-20 drops of hand expressed colostrum at any non-feeds.       Breast Assessment  Left Breast: Medium  Left Nipple: Everted, Intact  Right Breast: Medium  Right Nipple: Everted, Intact  Breast- Feeding Assessment  Attends Breast-Feeding Classes: No  Breast-Feeding Experience: Yes  Breast Trauma/Surgery: No  Type/Quality: Good  Lactation Consultant Visits  Breast-Feedings: Good   Mother/Infant Observation  Mother Observation: Breast comfortable, Close hold, Holds breast, Lets baby end feeding, Nipple round on release, Recognizes feeding cues  Infant Observation: Lips flanged, upper, Lips flanged, lower, Latches nipple and aereolae, Breast tissue moves, Opens mouth, Relaxed after feeding  LATCH Documentation  Latch: Grasps breast, tongue down, lips flanged, rhythmic sucking  Audible Swallowing: Spontaneous and intermittent (24 hours old)  Type of Nipple: Everted (after stimulation)  Comfort (Breast/Nipple): Soft/non-tender  Hold (Positioning): Full assist, teach one side, mother does other, staff holds  [facility] Score: 9 not cancer. She seemed very scared today. I reassured her that this is very manageable and it should not effect fertility. I have recommended a conization of the cervix. We should be able to tailor a very narrow cone to remove the involved area, thus not having to remove significant bulk of the cervix. She was counseled on the risks, benefits, indications, and alternatives of the procedure. Her questions were answered and she wishes to proceed.       Signed By: Joe Krabbe., MD     2/17/2017/7:05 AM

## (undated) DEVICE — TOWEL SURG W17XL27IN STD BLU COT NONFENESTRATED PREWASHED

## (undated) DEVICE — Z INACTIVE NO USAGE TURNOVER KIT RM CLEANOP

## (undated) DEVICE — PERI/GYN PACK: Brand: CONVERTORS

## (undated) DEVICE — SOLUTION IV 1000ML 0.9% SOD CHL

## (undated) DEVICE — GOWN,SIRUS,FABRNF,XL,20/CS: Brand: MEDLINE

## (undated) DEVICE — REM POLYHESIVE ADULT PATIENT RETURN ELECTRODE: Brand: VALLEYLAB

## (undated) DEVICE — CATH URETH INTMIT ROB 16FR FUN -- CONVERT TO ITEM 179520

## (undated) DEVICE — AGENT HEMSTAT W2XL14IN OXIDIZED REGENERATED CELOS ABSRB FOR

## (undated) DEVICE — SUTURE VCRL SZ 0 L36IN ABSRB VLT L36MM CT-1 1/2 CIR J346H

## (undated) DEVICE — ROCKER SWITCH PENCIL BLADE ELECTRODE, HOLSTER: Brand: EDGE

## (undated) DEVICE — ARGYLE FRAZIER SURGICAL SUCTION INSTRUMENT 10 FR/CH (3.3 MM): Brand: ARGYLE

## (undated) DEVICE — SURGICAL PROCEDURE PACK BASIN MAJ SET CUST NO CAUT

## (undated) DEVICE — 1200 GUARD II KIT W/5MM TUBE W/O VAC TUBE: Brand: GUARDIAN

## (undated) DEVICE — HANDLE LT SNAP ON ULT DURABLE LENS FOR TRUMPF ALC DISPOSABLE

## (undated) DEVICE — PAD SANIT NPKN 4IN GRD

## (undated) DEVICE — DEVON™ KNEE AND BODY STRAP 60" X 3" (1.5 M X 7.6 CM): Brand: DEVON

## (undated) DEVICE — TRAY PREP DRY W/ PREM GLV 2 APPL 6 SPNG 2 UNDPD 1 OVERWRAP

## (undated) DEVICE — INTENDED FOR TISSUE SEPARATION, AND OTHER PROCEDURES THAT REQUIRE A SHARP SURGICAL BLADE TO PUNCTURE OR CUT.: Brand: BARD-PARKER ® CARBON RIB-BACK BLADES